# Patient Record
Sex: MALE | Race: WHITE | NOT HISPANIC OR LATINO | Employment: OTHER | URBAN - METROPOLITAN AREA
[De-identification: names, ages, dates, MRNs, and addresses within clinical notes are randomized per-mention and may not be internally consistent; named-entity substitution may affect disease eponyms.]

---

## 2021-05-29 ENCOUNTER — RECORDS - HEALTHEAST (OUTPATIENT)
Dept: ADMINISTRATIVE | Facility: CLINIC | Age: 61
End: 2021-05-29

## 2021-05-30 ENCOUNTER — RECORDS - HEALTHEAST (OUTPATIENT)
Dept: ADMINISTRATIVE | Facility: CLINIC | Age: 61
End: 2021-05-30

## 2021-06-01 ENCOUNTER — RECORDS - HEALTHEAST (OUTPATIENT)
Dept: ADMINISTRATIVE | Facility: CLINIC | Age: 61
End: 2021-06-01

## 2021-06-27 ENCOUNTER — HEALTH MAINTENANCE LETTER (OUTPATIENT)
Age: 61
End: 2021-06-27

## 2021-10-17 ENCOUNTER — HEALTH MAINTENANCE LETTER (OUTPATIENT)
Age: 61
End: 2021-10-17

## 2022-07-23 ENCOUNTER — HEALTH MAINTENANCE LETTER (OUTPATIENT)
Age: 62
End: 2022-07-23

## 2022-10-01 ENCOUNTER — HEALTH MAINTENANCE LETTER (OUTPATIENT)
Age: 62
End: 2022-10-01

## 2023-03-16 ENCOUNTER — TRANSFERRED RECORDS (OUTPATIENT)
Dept: HEALTH INFORMATION MANAGEMENT | Facility: CLINIC | Age: 63
End: 2023-03-16

## 2023-03-26 ENCOUNTER — HOSPITAL ENCOUNTER (EMERGENCY)
Facility: CLINIC | Age: 63
Discharge: HOME | End: 2023-03-26
Attending: EMERGENCY MEDICINE | Admitting: EMERGENCY MEDICINE

## 2023-03-26 ENCOUNTER — APPOINTMENT (OUTPATIENT)
Dept: ULTRASOUND IMAGING | Facility: CLINIC | Age: 63
End: 2023-03-26
Attending: EMERGENCY MEDICINE

## 2023-03-26 VITALS
HEART RATE: 65 BPM | DIASTOLIC BLOOD PRESSURE: 96 MMHG | HEIGHT: 75 IN | WEIGHT: 300 LBS | BODY MASS INDEX: 37.3 KG/M2 | RESPIRATION RATE: 18 BRPM | TEMPERATURE: 97.3 F | SYSTOLIC BLOOD PRESSURE: 179 MMHG | OXYGEN SATURATION: 96 %

## 2023-03-26 DIAGNOSIS — L03.116 CELLULITIS OF LEFT LOWER EXTREMITY: ICD-10-CM

## 2023-03-26 DIAGNOSIS — I82.4Y2 DEEP VEIN THROMBOSIS (DVT) OF PROXIMAL VEIN OF LEFT LOWER EXTREMITY, UNSPECIFIED CHRONICITY (H): ICD-10-CM

## 2023-03-26 PROBLEM — Z79.01 ANTICOAGULATION MONITORING, INR RANGE 2-3: Status: ACTIVE | Noted: 2017-04-26

## 2023-03-26 PROCEDURE — 99284 EMERGENCY DEPT VISIT MOD MDM: CPT | Mod: 25

## 2023-03-26 PROCEDURE — 93971 EXTREMITY STUDY: CPT | Mod: LT

## 2023-03-26 RX ORDER — WARFARIN SODIUM 5 MG/1
5 TABLET ORAL DAILY
Qty: 30 TABLET | Refills: 0 | Status: SHIPPED | OUTPATIENT
Start: 2023-03-26 | End: 2023-05-08

## 2023-03-26 RX ORDER — CEPHALEXIN 500 MG/1
1000 CAPSULE ORAL 3 TIMES DAILY
Qty: 42 CAPSULE | Refills: 0 | Status: SHIPPED | OUTPATIENT
Start: 2023-03-26 | End: 2023-04-02

## 2023-03-26 RX ORDER — ENOXAPARIN SODIUM 100 MG/ML
100 INJECTION SUBCUTANEOUS 2 TIMES DAILY
Qty: 14 ML | Refills: 0 | Status: SHIPPED | OUTPATIENT
Start: 2023-03-26 | End: 2023-04-02

## 2023-03-26 ASSESSMENT — ACTIVITIES OF DAILY LIVING (ADL): ADLS_ACUITY_SCORE: 33

## 2023-03-26 NOTE — DISCHARGE INSTRUCTIONS
For the first week you will be taking all 3 medications.  After 7 days you can stop taking the Lovenox and Keflex.    Antibiotics take about 24 hours to kick in.  That is when the inflammation and swelling and redness stops.  If you notice that your symptoms are getting worse after 24 hours, or if at any time you have nausea, vomiting or unable to keep down antibiotics, I would like you to come back to the emergency department.

## 2023-03-26 NOTE — ED TRIAGE NOTES
Patient presents to ED with some redness, swelling and warmth to area of L calf, patient reports hx of bilateral leg edema that is his baseline, hx of multiple DVTs, patient took himself off of Warfarin about one month ago.  Christy Scott RN.......3/26/2023 10:04 AM     Triage Assessment     Row Name 03/26/23 1001       Triage Assessment (Adult)    Airway WDL WDL       Respiratory WDL    Respiratory WDL WDL       Skin Circulation/Temperature WDL    Skin Circulation/Temperature WDL WDL       Cardiac WDL    Cardiac WDL WDL       Peripheral/Neurovascular WDL    Peripheral Neurovascular WDL WDL       Cognitive/Neuro/Behavioral WDL    Cognitive/Neuro/Behavioral WDL WDL

## 2023-03-26 NOTE — ED PROVIDER NOTES
EMERGENCY DEPARTMENT ENCOUNTER      NAME: Jimmie Oconnor  AGE: 62 year old male  YOB: 1960  MRN: [id number]  EVALUATION DATE & TIME: No admission date for patient encounter.    PCP: Raymon Ramos    ED PROVIDER: Simone Brown M.D.      Chief Complaint   Patient presents with     Leg Swelling         FINAL IMPRESSION:  1. Cellulitis of left lower extremity    2. Deep vein thrombosis (DVT) of proximal vein of left lower extremity, unspecified chronicity (H)          ED COURSE & MEDICAL DECISION MAKING:    Pertinent Labs & Imaging studies reviewed below.  All EKGs below represent my independent interpretation.   ED Course as of 03/26/23 1253   Sun Mar 26, 2023   1018 Patient is a 62-year-old gentleman who presents increased welling in his left calf with some erythema to the anterior aspect of the tibia.  He twisted his ankle 1 week ago, but the redness is new.  History of multiple DVTs, no longer on Coumadin.  Examination the anterior lower leg does have about a 4 to 5 inch diameter area of erythema, either early cellulitis or venous stasis inflammation.  Swelling could be posttraumatic, but plan to get ultrasound to rule out recurrent clot.   1227 US Lower Extremity Venous Duplex Left  1.  Nonocclusive thrombus in the mid and distal femoral vein and popliteal vein. Nonocclusive and occlusive deep veins in the calf. There was thrombus in these areas on the 2009 comparison study as well.  2.  Occluded superficial varices in the calf. Previously superficial calf veins were occluded.  3.  It is difficult to determine which of these changes are new.    I discussed ultrasound findings with the patient.  It is possible to tell whether some of the occlusions are acute or chronic, and I think it would be safest to restart his anticoagulation plan.  He was given a week of Lovenox, 30 days of warfarin.  I also sent him home with a week of Keflex for suspected early cellulitis on the left lower leg.  We  discussed return precautions.  He will follow-up with outpatient primary care, vascular.    10:15 AM I introduced myself to the patient, obtained patient history, performed a physical exam, and discussed plan for ED workup including potential diagnostic laboratory/imaging studies and interventions.     Additional ED Course Timestamps:  12:53 PM Rechecked and updated the patient. We discussed the plan for discharge and the patient is agreeable. Reviewed supportive cares, symptomatic treatment, outpatient follow up, and reasons to return to the Emergency Department. Patient to be discharged by ED RN.     Medical Decision Making    History:    Supplemental history from: Documented in chart, if applicable    External Record(s) reviewed: Documented in chart, if applicable.    Work Up:    Chart documentation includes differential considered and any EKGs or imaging independently interpreted by provider, where specified.    In additional to work up documented, I considered the following work up: Documented in chart, if applicable.    External consultation:    Discussion of management with another provider: Documented in chart, if applicable    Complicating factors:    Care impacted by chronic illness: N/A    Care affected by social determinants of health: N/A    Disposition considerations: Discharge. I prescribed additional prescription strength medication(s) as charted. N/A.        At the conclusion of the encounter I discussed the results of all of the tests and the disposition. The questions were answered. The patient or family acknowledged understanding and was agreeable with the care plan.       MEDICATIONS GIVEN IN THE EMERGENCY:  Medications - No data to display      NEW PRESCRIPTIONS STARTED AT TODAY'S ER VISIT  New Prescriptions    CEPHALEXIN (KEFLEX) 500 MG CAPSULE    Take 2 capsules (1,000 mg) by mouth 3 times daily for 7 days    ENOXAPARIN ANTICOAGULANT (LOVENOX) 100 MG/ML SYRINGE    Inject 1 mL (100 mg)  "Subcutaneous 2 times daily for 7 days    WARFARIN ANTICOAGULANT (COUMADIN) 5 MG TABLET    Take 1 tablet (5 mg) by mouth daily for 30 days          =================================================================    HPI    Patient information was obtained from: patient    Use of : N/A      Jimmie Oconnor is a 62 year old male with a pertinent history of recurrent DVT and obesity who presents to this ED for evaluation of leg edema and erythema.    On 3/19, patient twisted his left ankle while walking on ice. Since then, he has noticed edema and redness in his anterior tibial area. The redness and swelling has reportedly moved upwards. He denies any calf pain or any URI symptoms. He is concerned for cellulitis or a DVT.    He denies any other pain.    Patient is no longer anticoagulated. He uses other natural methods such as Narcisa Therapy to improve his circulation and help prevent blood clots.    Patient reports that his current symptoms are different than when he had a blood clot in his leg.       REVIEW OF SYSTEMS   Review of Systems   All other systems reviewed and are negative.  See HPI documentation.    VITALS:  BP (!) 174/103   Pulse 76   Temp 98.8  F (37.1  C) (Oral)   Resp 18   Ht 1.905 m (6' 3\")   Wt 136.1 kg (300 lb)   SpO2 96%   BMI 37.50 kg/m      PHYSICAL EXAM    Constitutional: Well developed, well nourished. Comfortable appearing.  HENT: Normocephalic, atraumatic, mucous membranes moist, nose normal  Eyes: PERRL, EOMI, conjunctiva normal, no discharge.  Neck- Supple, gross ROM intact.   Respiratory: Normal work of breathing, normal rate, speaks in full sentences  Cardiovascular: Normal heart rate  Musculoskeletal: Moving all 4 extremities intentionally and without pain. Area of erythema to anterior lower left leg. Bilateral lower extremity edema (left greater than right).   Neurologic: Alert & oriented x 3, cranial nerves grossly intact. Normal gross coordination  Psychiatric: " Affect normal, cooperative.        I, Tiffanie Earl am serving as a scribe to document services personally performed by Dr. Simone Brown based on my observation and the provider's statements to me. I, Simone Brown MD attest that Tiffanie Earl is acting in a scribe capacity, has observed my performance of the services and has documented them in accordance with my direction.    Simone Brown M.D.  Emergency Medicine  [facility] EMERGENCY ROOM  [address] [address]  [phone]  Dept: [phone]     Simone Brown MD  03/26/23 8786

## 2023-03-27 ENCOUNTER — TELEPHONE (OUTPATIENT)
Dept: OTHER | Facility: CLINIC | Age: 63
End: 2023-03-27

## 2023-03-27 DIAGNOSIS — Z78.9 WARFARIN AND ENOXAPARIN PRESCRIBED AT DISCHARGE: ICD-10-CM

## 2023-03-27 DIAGNOSIS — Z86.718 HISTORY OF RECURRENT DEEP VEIN THROMBOSIS (DVT): Primary | ICD-10-CM

## 2023-03-27 NOTE — TELEPHONE ENCOUNTER
Future Appointments   Date Time Provider Department Center   3/29/2023  1:00 PM [facility]   4/13/2023  8:00 AM Wilfredo Avery MD [facility]

## 2023-03-27 NOTE — TELEPHONE ENCOUNTER
Appt Note:  Self-referral.  Diagnosed with cellulitis and multiple DVT of LLE 3/26/23, lovenox, warfarin initiated in ED.  LLE Venous US 3/26/23 in Epic; d-dimer prior to visit.    Please arrange for:      D-dimer (non-fasting lab)     In clinic consult with Vascular Medicine 2-3 days later.

## 2023-03-27 NOTE — TELEPHONE ENCOUNTER
[facility]    Who is the name of the provider?: no provider yet  What is the location you see this provider at/preferred location?: [place]  Person calling / Facility: Jen[facility]  Phone number:  [phone]  Nurse call back needed:  n/a     Reason for call:  Pt wife called to get him scheduled with a provider (self referral) for clotting, left leg is red after a recent fall. His recent scan they were unable to tell if they were old clots or new. Pt was in ED yesterday    Pharmacy location:  n/a  Outside Imaging: yes   Can we leave a detailed message on this number?  yes

## 2023-03-29 ENCOUNTER — LAB (OUTPATIENT)
Dept: LAB | Facility: CLINIC | Age: 63
End: 2023-03-29

## 2023-03-29 DIAGNOSIS — Z86.718 HISTORY OF RECURRENT DEEP VEIN THROMBOSIS (DVT): ICD-10-CM

## 2023-03-29 DIAGNOSIS — Z78.9 WARFARIN AND ENOXAPARIN PRESCRIBED AT DISCHARGE: ICD-10-CM

## 2023-03-29 LAB — D DIMER PPP FEU-MCNC: 2.62 UG/ML FEU (ref 0–0.5)

## 2023-03-29 PROCEDURE — 85379 FIBRIN DEGRADATION QUANT: CPT

## 2023-03-29 PROCEDURE — 36415 COLL VENOUS BLD VENIPUNCTURE: CPT

## 2023-04-13 ENCOUNTER — PATIENT OUTREACH (OUTPATIENT)
Dept: ONCOLOGY | Facility: CLINIC | Age: 63
End: 2023-04-13

## 2023-04-13 ENCOUNTER — OFFICE VISIT (OUTPATIENT)
Dept: OTHER | Facility: CLINIC | Age: 63
End: 2023-04-13
Attending: INTERNAL MEDICINE

## 2023-04-13 ENCOUNTER — LAB (OUTPATIENT)
Dept: LAB | Facility: CLINIC | Age: 63
End: 2023-04-13
Attending: INTERNAL MEDICINE

## 2023-04-13 VITALS
OXYGEN SATURATION: 95 % | WEIGHT: 309 LBS | HEART RATE: 73 BPM | DIASTOLIC BLOOD PRESSURE: 86 MMHG | BODY MASS INDEX: 38.62 KG/M2 | SYSTOLIC BLOOD PRESSURE: 124 MMHG

## 2023-04-13 DIAGNOSIS — Z86.711 HISTORY OF PULMONARY EMBOLISM: ICD-10-CM

## 2023-04-13 DIAGNOSIS — I83.12 VARICOSE VEINS OF LEFT LOWER EXTREMITY WITH INFLAMMATION: ICD-10-CM

## 2023-04-13 DIAGNOSIS — R06.83 SNORING: ICD-10-CM

## 2023-04-13 DIAGNOSIS — I82.512 CHRONIC DEEP VEIN THROMBOSIS (DVT) OF FEMORAL VEIN OF LEFT LOWER EXTREMITY (H): ICD-10-CM

## 2023-04-13 DIAGNOSIS — I82.512 CHRONIC DEEP VEIN THROMBOSIS (DVT) OF FEMORAL VEIN OF LEFT LOWER EXTREMITY (H): Primary | ICD-10-CM

## 2023-04-13 DIAGNOSIS — E83.110 HEREDITARY HEMOCHROMATOSIS (H): ICD-10-CM

## 2023-04-13 DIAGNOSIS — I89.0 LYMPHEDEMA: ICD-10-CM

## 2023-04-13 DIAGNOSIS — E66.01 MORBID OBESITY (H): ICD-10-CM

## 2023-04-13 LAB
ALBUMIN SERPL BCG-MCNC: 4.8 G/DL (ref 3.5–5.2)
ALP SERPL-CCNC: 61 U/L (ref 40–129)
ALT SERPL W P-5'-P-CCNC: 31 U/L (ref 10–50)
ANION GAP SERPL CALCULATED.3IONS-SCNC: 9 MMOL/L (ref 7–15)
AST SERPL W P-5'-P-CCNC: 22 U/L (ref 10–50)
BILIRUB SERPL-MCNC: 0.5 MG/DL
BUN SERPL-MCNC: 27.2 MG/DL (ref 8–23)
CALCIUM SERPL-MCNC: 9.9 MG/DL (ref 8.8–10.2)
CHLORIDE SERPL-SCNC: 102 MMOL/L (ref 98–107)
CREAT SERPL-MCNC: 1.22 MG/DL (ref 0.67–1.17)
DEPRECATED HCO3 PLAS-SCNC: 27 MMOL/L (ref 22–29)
FACT X ACT/NOR PPP CHRO: 39 % (ref 70–130)
FERRITIN SERPL-MCNC: 217 NG/ML (ref 31–409)
GFR SERPL CREATININE-BSD FRML MDRD: 67 ML/MIN/1.73M2
GLUCOSE SERPL-MCNC: 106 MG/DL (ref 70–99)
INR PPP: 1.25 (ref 0.85–1.15)
IRON BINDING CAPACITY (ROCHE): 293 UG/DL (ref 240–430)
IRON SATN MFR SERPL: 53 % (ref 15–46)
IRON SERPL-MCNC: 156 UG/DL (ref 61–157)
POTASSIUM SERPL-SCNC: 4.8 MMOL/L (ref 3.4–5.3)
PROT SERPL-MCNC: 7.7 G/DL (ref 6.4–8.3)
SODIUM SERPL-SCNC: 138 MMOL/L (ref 136–145)

## 2023-04-13 PROCEDURE — G0463 HOSPITAL OUTPT CLINIC VISIT: HCPCS | Performed by: INTERNAL MEDICINE

## 2023-04-13 PROCEDURE — 99205 OFFICE O/P NEW HI 60 MIN: CPT | Performed by: INTERNAL MEDICINE

## 2023-04-13 PROCEDURE — G0463 HOSPITAL OUTPT CLINIC VISIT: HCPCS

## 2023-04-13 PROCEDURE — 36415 COLL VENOUS BLD VENIPUNCTURE: CPT

## 2023-04-13 PROCEDURE — 85613 RUSSELL VIPER VENOM DILUTED: CPT

## 2023-04-13 PROCEDURE — 83550 IRON BINDING TEST: CPT

## 2023-04-13 PROCEDURE — 85390 FIBRINOLYSINS SCREEN I&R: CPT | Mod: 26 | Performed by: PATHOLOGY

## 2023-04-13 PROCEDURE — 82728 ASSAY OF FERRITIN: CPT

## 2023-04-13 PROCEDURE — 85610 PROTHROMBIN TIME: CPT

## 2023-04-13 PROCEDURE — 86146 BETA-2 GLYCOPROTEIN ANTIBODY: CPT

## 2023-04-13 PROCEDURE — 86147 CARDIOLIPIN ANTIBODY EA IG: CPT

## 2023-04-13 PROCEDURE — 85260 CLOT FACTOR X STUART-POWER: CPT

## 2023-04-13 PROCEDURE — 80053 COMPREHEN METABOLIC PANEL: CPT

## 2023-04-13 NOTE — PROGRESS NOTES
[facility] INITIAL VASCULAR MEDICINE CONSULT    ( New Patient visit)       PRIMARY HEALTH CARE PROVIDER:  No primary MD (currently )    REFERRING HEALTH CARE PROVIDER;  Self-Referred    REASON FOR CONSULT: Evaluation and management of recurrent thromboembolic event he has a total 2 episodes of left lower extremity DVT and bilateral PE in 2006, 2009 was warfarin chronically discontinued few months ago then developed left lower extremity redness erythema swelling underwent venous duplex which showed questionable new clot versus old clot mildly elevated D-dimer and is back on warfarin again.      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 both parents has a hemochromatosis and he was tested positive with iron overload and he underwent periodic therapeutic blood draws for many years, morbidly obese BMI greater than 38, snoring but no formal diagnosis of sleep apnea developed first lifetime thromboembolic event in 2006 left lower extremity DVT with bilateral PE was on warfarin for a while then followed by again recurrent episode in 2009 since then on warfarin chronically but few months ago he stopped taking the warfarin and recently left lower extremity swelling redness erythema prompted evaluation D-dimer mildly elevated ultrasound questionable old versus mixture of old and no clot.  He is now back on warfarin.  He currently has no primary care physician but has been following up with a functional medicine chiropractic doctor and taking a lot of supplements.  He says he was tested with the factor V Leyden, factor II mutation and other hypercoagulable studies all of them were negative many years ago outside the system unable to locate them.  He also developed left leg swelling with the discoloration and stasis dermatitis on the left leg.  He has a history of varicose veins bilaterally left more than right side and no  previous ablation or interventions.  He has never seen a hematologist for hemochromatosis evaluation but he was tested by his functional chiropractic physician in the past    He underwent extensive laboratory testing on March 16, 2023 [facility] all of them are negative except potassium elevated 5.4 iron was 201% sat was 66 no diabetes INR was normal and he was not taking warfarin at the time hemoglobin was 15.3 MCH was slightly elevated liver function tests are normal.    He accompanied by his wife today for this visit    He is new to me reviewed available records in the epic and updated chart    PAST MEDICAL HISTORY  Past Medical History:   Diagnosis Date     DVT (deep venous thrombosis) (H) reciurrent LLE      Hereditary hemochromatosis (H)      Morbid obesity (H)      Pulmonary embolism (H)      Snoring      Varicose veins of left leg with edema        CURRENT MEDICATIONS  warfarin ANTICOAGULANT (COUMADIN) 5 MG tablet, Take 1 tablet (5 mg) by mouth daily for 30 days    No current facility-administered medications on file prior to visit.      PAST SURGICAL HISTORY:  History reviewed. No pertinent surgical history.    ALLERGIES   No Known Allergies    FAMILY HISTORY  Family History   Problem Relation Age of Onset     Hemochromatosis Mother      Hemochromatosis Father        VASCULAR FAMILY HISTORY  1st order relative with atherosclerotic PAD: No  1st order relative with AAA: Father ?   Family history of Familial Hyperlipidemia No  Family History of Hypercoagulable state:No    VASCULAR RISK FACTORS  1. Diabetes:No   2. Smoking: has never smoked.  3. HTN: uncontrolled  4.Hyperlipidemia: No      SOCIAL HISTORY  Social History     Socioeconomic History     Marital status:      Spouse name: Not on file     Number of children: Not on file     Years of education: Not on file     Highest education level: Not on file   Occupational History     Not on file   Tobacco Use     Smoking status: Never     Smokeless tobacco:  Not on file   Vaping Use     Vaping status: Not on file   Substance and Sexual Activity     Alcohol use: Yes     Drug use: Never     Sexual activity: Not on file   Other Topics Concern     Not on file   Social History Narrative     Not on file     Social Determinants of Health     Financial Resource Strain: Not on file   Food Insecurity: Not on file   Transportation Needs: Not on file   Physical Activity: Not on file   Stress: Not on file   Social Connections: Not on file   Intimate Partner Violence: Not on file   Housing Stability: Not on file       ROS:   General: No change in weight or appetite.  Normal energy.  No fever or chills  History of snoring and his wife thinks occasional apneic episodes  No formal evaluation of sleep apnea  Eyes: Negative for vision changes or eye problems  ENT: No problems with ears, nose or throat.  No difficulty swallowing.  Resp: No coughing, wheezing or shortness of breath  CV: No chest pains or palpitations  GI: No nausea, vomiting,  heartburn, abdominal pain, diarrhea, constipation or change in bowel habits  : No urinary frequency or dysuria, bladder or kidney problems  Musculoskeletal: No significant muscle or joint pains  Neurologic: No headaches, numbness, tingling, weakness, problems with balance or coordination  Psychiatric: No problems with anxiety, depression or mental health  Heme/immune/allergy: No history of bleeding or clotting problems or anemia.  No allergies or immune system problems  Endocrine: No history of thyroid disease, diabetes or other endocrine disorders  Skin: No rashes,worrisome lesions or skin problems  Vascular: History of recurrent DVT of left lower extremity and PE  History of bilateral lower extremity varicose veins left more than right side  Left leg is larger than right side with skin discoloration  Recent history of a cellulitis??  Treated with Keflex for a week  No leg pain or claudication symptoms  EXAM:  /86 (BP Location: Left arm,  Patient Position: Chair, Cuff Size: Adult Large)   Pulse 73   Wt 309 lb (140.2 kg)   SpO2 95%   BMI 38.62 kg/m    In general, the patient is a pleasant male in no apparent distress.    HEENT: NC/AT.  PERRLA.  EOMI.  Sclerae white, not injected.  Nares clear.  Pharynx without erythema or exudate.  Dentition intact.    Neck: No adenopathy.  No thyromegaly. Carotids +2/2 bilaterally without bruits.  No jugular venous distension.   Heart: RRR. Normal S1, S2 splits physiologically. No murmur, rub, click, or gallop. The PMI is in the 5th ICS in the midclavicular line. There is no heave.    Lungs: CTA.  No ronchi, wheezes, rales.  No dullness to percussion.   Abdomen: Soft, nontender, nondistended. No organomegaly. No AAA.  No bruits.   Extremities: Vascular:  He has a symmetrical palpable peripheral pulses  Left lower extremity is much larger than right lower extremity  Bilateral lower extremity varicose veins CEAP 4 CVI left worse than right side  No venous ulceration  Stasis dermatitis of left lower extremity  Phlebo- lymphedema features noted loss of contour of the  left leg dorsum of the foot swelling  No signs of a cellulitis      Labs:    BMP RESULTS:  Lab Results   Component Value Date     09/14/2019    POTASSIUM 4.2 09/14/2019    CHLORIDE 102 09/14/2019    CO2 27 09/14/2019    ANIONGAP 8 09/14/2019    GLC 89 09/14/2019    BUN 16 09/14/2019    CR 1.03 09/14/2019    GFRESTIMATED >60 09/14/2019    GFRESTBLACK >60 09/14/2019    AGUSTIN 9.8 09/14/2019        Procedures:    EXAM: US LOWER EXTREMITY VENOUS DUPLEX LEFT  LOCATION: [facility]  DATE/TIME: 3/26/2023 12:00 PM     INDICATION: leg swelling, eval for blood clot. hx of prior DVT  COMPARISON: None.  TECHNIQUE: Venous Duplex ultrasound of the left lower extremity with and without compression, augmentation and duplex. Color flow and spectral Doppler with waveform analysis performed.     FINDINGS: Exam includes the common femoral,  femoral, popliteal, and contralateral common femoral veins as well as segmentally visualized deep calf veins and greater saphenous vein.      LEFT: The common femoral vein, great saphenous vein, deep femoral vein, and proximal femoral vein are patent without thrombus. There is nonocclusive thrombus in the mid and distal femoral vein and popliteal vein. The posterior tibial vein is patent. The   peroneal vein is occluded. There is nonocclusive thrombus in the gastrocnemius vein. A few varicose veins are occluded. There is mild superficial edema. No popliteal cyst.                                                                      IMPRESSION:  1.  Nonocclusive thrombus in the mid and distal femoral vein and popliteal vein. Nonocclusive and occlusive deep veins in the calf. There was thrombus in these areas on the 2009 comparison study as well.  2.  Occluded superficial varices in the calf. Previously superficial calf veins were occluded.  3.  It is difficult to determine which of these changes are new.      Discussed with Dr. Brown by me over telephone at 12:18 PM.    Assessment and Plan:     1. Chronic deep vein thrombosis (DVT) of femoral vein of left lower extremity (H) recurrent 2006 and 2009   2. History of recurrent pulmonary embolism 2006 and 2009   3. Varicose veins of left lower extremity with inflammation  4. Lymphedema ( phlebo-lymphedema)   5.  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  He went back on warfarin.  Currently has no primary care physician and he was following up with a functional chiropractic doctor and taking some supplements and taking warfarin 5 mg  daily.   Unable to find previous INR test except on March 16, 2023 PT was normal range and he was not taking warfarin at that time.  Reviewed recent venous duplex ultrasound.  He also had a multiple lab tests done [facility] he brought a paper copy which was reviewed and sent to media.  Per patient he underwent factor II factor V and some other hypercoagulable studies many years ago which were negative.  Currently blood bank is not letting him do therapeutic blood draws.  He has no personal history of malignancy, risk factors are morbidly obese with possible DARIUS.  He has a 1 episode of cellulitis of left lower extremity treated with Keflex few weeks ago.    His exam is consistent with Phlebo-lymphedema with venous hypertension and stasis dermatitis  No venous ulceration    I had a lengthy discussion with the patient and his wife regarding recurrent DVT and its impact cons bleeding post thrombotic syndrome with venous hypertension and also chronic venous insufficiency with stasis dermatitis risk for recurrent cellulitis, thrombophlebitis given underlying varicose veins etc..    We discussed about the warfarin versus newer agents DOACs and he is willing to explore I have given information and coupons for Xarelto and Eliquis and we will discuss with his insurance and decide in the future switching    He has been using compression stockings, never seen edema therapist.  He is a long-term/lifetime candidate for anticoagulation this was communicated with the patient  Since he is on warfarin will get APLA testing if they come back negative to simply we can switch to one of the DOAC of his choice    Will also benefit seeing with edema therapist for compression, wraps, MLD and pump therapy etc. he has a old pump at home purchased online suggested him to take it with him for lymphedema therapist appointment  A referral to lymphedema therapist was done    Will get other baseline blood work along with INR and chromogenic factor X  today    If he decides to continue warfarin and INR and chromogenic factor X correlates simply follow INR goal of 2-3 and manage warfarin dose based on the results    If that does not correlate warfarin to be managed based on the chromogenic factor X goal is between 20 and 40 and this is inversely correlates with the INR.    He was advised to establish with new primary care physician preferably internal medicine near [facility]    A referral was made to see hematology oncologist at the [facility] for underlying hemochromatosis and recurrent thromboembolism      - Lupus Anticoagulant Panel; Future  - Cardiolipin Radha IgG and IgM; Future  - Beta 2 Glycoprotein Antibodies IGG IGM; Future  - INR; Future  - Factor 10 chromogenic; Future        - Lymphedema Therapy Referral; Future    5. Hereditary hemochromatosis (H)    Many years ago he was diagnosed hemochromatosis and never seen hematologist he does have a elevated iron but recent ferritin is normal and liver function tests are normal A1c is normal  Previously he underwent therapeutic blood draws  He is [place] descent and both parents has history of hemochromatosis  Unable to find previous work-up for hemochromatosis which was done many years ago outside the system.  Given his recurrent thromboembolic events he will benefit seeing hematology oncology/bleeding and clotting disorder clinic at the [facility] referral was done  I will repeat iron panel, comprehensive metabolic panel today    - IRON; Future  - IRON AND IRON BINDING CAPACITY; Future  - FERRITIN; Future  - Adult Oncology/Hematology  Referral; Future  - Comprehensive metabolic panel; Future    6. Morbid obesity (H) 38.62  7. Snoring     He is morbidly obese with snoring and his wife thinks periodic apneic episodes as well suggested patient to discuss with primary care physician and go for formal sleep study  Lose weight      More than 60 minutes spent on the date of the  encounter doing chart review, history and exam, documentation, and further activities as noted above.  He is new to this clinic reviewed available extensive records in the epic and updated chart    AVS with written instructions given to the patient  He and his wife had a lot of questions and all of them were answered    Virtual visit approximately a month    Wilfredo Avery MD, FAHA, FSVM,FNLA, FACP  Vascular Medicine  Clinical Hypertension specialist  Clinical Lipidologist    .

## 2023-04-13 NOTE — PATIENT INSTRUCTIONS
1.  Since you have a recurrent left lower extremity DVT and recurrent pulmonary embolism you are a long-term/lifetime candidate for anticoagulation.  Since you are already on warfarin we will check the INR and chromogenic factor X today along with APLA testing (lupus anticoagulant, cardiolipin antibodies, beta-2 glycoprotein antibodies) will not pursue other hypercoagulable studies which may come back falsely low in the setting of warfarin.  If the INR and chromogenic factor X correlates with can monitor simply INR and managed the warfarin.  2.  He will benefit seeing hematology/bleeding and clotting disorders clinic at the [facility] given history of hemochromatosis and recurrent DVT and PE for further evaluation and management    3.  Your BMI is greater than 38 with obesity and snoring you will benefit getting sleep study to rule out sleep apnea through the primary care physician's office and please establish with new primary care physician in [facility] [place]    4.  You have a left lower extremity secondary lymphedema ( Phlebo-lymphedema) compression, MLD, wraps will help and I will make a referral to see edema therapist    5.  Will check baseline blood work today including the CMP, INR, chromogenic factor X, iron panel, APLA testing    6.  If the APLA test come back negative you may decide switching warfarin to either Xarelto or Eliquis we can discuss in the future.(Please explore the coupons I have given today)    7.  Take lymphatic formula supplementation which will help both venous insufficiency and lymphatic impairment    8.  Avoid excess vitamin supplements when you are taking warfarin which can cause issues with maintaining therapeutic levels of INR    9.  Virtual video visit in 1 month

## 2023-04-13 NOTE — PROGRESS NOTES
[facility]        Patient is here for a  follow up.     Pt is currently taking Warfarin.    /86 (BP Location: Left arm, Patient Position: Chair, Cuff Size: Adult Large)   Pulse 73   Wt 309 lb (140.2 kg)   SpO2 95%   BMI 38.62 kg/m      The provider has been notified that the patient has no concerns.     Questions patient would like addressed today are: N/A.    Refills are needed: N/A    Has homecare services and agency name:  Sabrina Trevino MA

## 2023-04-13 NOTE — PROGRESS NOTES
Referral received for benign heme services, see below.    Referral reason: Hemochromatosis    Current abnormal labs: Available in Chart Review    Outreach: MyChart sent to patient    Plan: Triage instructions updated and sent to NPS for completion.

## 2023-04-14 ENCOUNTER — TELEPHONE (OUTPATIENT)
Dept: OTHER | Facility: CLINIC | Age: 63
End: 2023-04-14

## 2023-04-14 DIAGNOSIS — I82.512 CHRONIC DEEP VEIN THROMBOSIS (DVT) OF FEMORAL VEIN OF LEFT LOWER EXTREMITY (H): Primary | ICD-10-CM

## 2023-04-14 DIAGNOSIS — I89.0 LYMPHEDEMA: ICD-10-CM

## 2023-04-14 LAB
DRVVT CONFIRM NORMALIZED RATIO: 1.15
DRVVT SCREEN MIX RATIO: 0.98
DRVVT SCREEN RATIO: 1.08
LA PPP-IMP: NEGATIVE
LUPUS INTERPRETATION: ABNORMAL
PTT RATIO: 0.92
THROMBIN TIME: 18.3 SECONDS (ref 13–19)

## 2023-04-14 NOTE — TELEPHONE ENCOUNTER
Patient needs to be scheduled for virtual follow up in 1 month for history of DVT and lymphedema. Labs were completed today- we will call with results.    Appt notes: 1 month follow up to 4/13/23 DVT and lymphedema. Labs completed on 4/14/23.    Lena BLANKENSHIP, RN    [facility]  Office: [phone]  Fax: [phone]

## 2023-04-15 LAB
B2 GLYCOPROT1 IGG SERPL IA-ACNC: 1.1 U/ML
B2 GLYCOPROT1 IGM SERPL IA-ACNC: <2.4 U/ML
CARDIOLIPIN IGG SER IA-ACNC: <2 GPL-U/ML
CARDIOLIPIN IGG SER IA-ACNC: NEGATIVE
CARDIOLIPIN IGM SER IA-ACNC: <2 MPL-U/ML
CARDIOLIPIN IGM SER IA-ACNC: NEGATIVE

## 2023-04-19 ENCOUNTER — TELEPHONE (OUTPATIENT)
Dept: OTHER | Facility: CLINIC | Age: 63
End: 2023-04-19

## 2023-04-19 NOTE — RESULT ENCOUNTER NOTE
All of your recent labs looks good and normal except,    Low INR but chromogenic factor X is good range goal is between 20-40    Slightly elevated serum creatinine 1.22 (drink plenty of water and avoid ibuprofen, Advil and Aleve type of medications)    Iron saturation index was elevated but rest of the iron panel good range    Glucose is slightly elevated at 106    Continue same plan discussed in the clinic and when you are ready we can switch warfarin to either Xarelto or Eliquis.    If you continue warfarin please get INR and chromogenic factor X for next 3 lab draws and manage warfarin dose based on chromogenic factor X goal is between 20 and 40    Establish care with a primary care physician and follow-up with the hematologist as suggested    Follow-up with me virtually as suggested

## 2023-04-19 NOTE — TELEPHONE ENCOUNTER
----- Message from Wilfredo Avery MD sent at 4/19/2023  9:17 AM CDT -----  All of your recent labs looks good and normal except,    Low INR but chromogenic factor X is good range goal is between 20-40    Slightly elevated serum creatinine 1.22 (drink plenty of water and avoid ibuprofen, Advil and Aleve type of medications)    Iron saturation index was elevated but rest of the iron panel good range    Glucose is slightly elevated at 106    Continue same plan discussed in the clinic and when you are ready we can switch warfarin to either Xarelto or Eliquis.    If you continue warfarin please get INR and chromogenic factor X for next 3 lab draws and manage warfarin dose based on chromogenic factor X goal is between 20 and 40    Establish care with a primary care physician and follow-up with the hematologist as suggested    Follow-up with me virtually as suggested

## 2023-04-19 NOTE — TELEPHONE ENCOUNTER
Relayed results via phone message.  Asked patient to call me with any further questions.    Domenica Solano RN BSN  [facility]  [phone]

## 2023-04-26 ENCOUNTER — ONCOLOGY VISIT (OUTPATIENT)
Dept: ONCOLOGY | Facility: CLINIC | Age: 63
End: 2023-04-26
Attending: INTERNAL MEDICINE

## 2023-04-26 ENCOUNTER — LAB (OUTPATIENT)
Dept: INFUSION THERAPY | Facility: CLINIC | Age: 63
End: 2023-04-26
Attending: INTERNAL MEDICINE

## 2023-04-26 ENCOUNTER — PATIENT OUTREACH (OUTPATIENT)
Dept: ONCOLOGY | Facility: CLINIC | Age: 63
End: 2023-04-26

## 2023-04-26 VITALS
SYSTOLIC BLOOD PRESSURE: 144 MMHG | WEIGHT: 310.4 LBS | TEMPERATURE: 98.3 F | DIASTOLIC BLOOD PRESSURE: 80 MMHG | RESPIRATION RATE: 16 BRPM | BODY MASS INDEX: 38.59 KG/M2 | HEIGHT: 75 IN | HEART RATE: 71 BPM

## 2023-04-26 DIAGNOSIS — E83.110 HEREDITARY HEMOCHROMATOSIS (H): Primary | ICD-10-CM

## 2023-04-26 DIAGNOSIS — R79.0 ABNORMAL RESULT OF IRON PROFILE TESTING: ICD-10-CM

## 2023-04-26 DIAGNOSIS — I82.402 RECURRENT ACUTE DEEP VEIN THROMBOSIS (DVT) OF LEFT LOWER EXTREMITY (H): ICD-10-CM

## 2023-04-26 LAB
ALBUMIN SERPL-MCNC: 3.9 G/DL (ref 3.5–5)
ALP SERPL-CCNC: 53 U/L (ref 45–120)
ALT SERPL W P-5'-P-CCNC: 23 U/L (ref 0–45)
ANION GAP SERPL CALCULATED.3IONS-SCNC: 8 MMOL/L (ref 5–18)
AST SERPL W P-5'-P-CCNC: 20 U/L (ref 0–40)
BASOPHILS # BLD AUTO: 0 10E3/UL (ref 0–0.2)
BASOPHILS NFR BLD AUTO: 1 %
BILIRUB SERPL-MCNC: 0.4 MG/DL (ref 0–1)
BUN SERPL-MCNC: 18 MG/DL (ref 8–22)
CALCIUM SERPL-MCNC: 9.2 MG/DL (ref 8.5–10.5)
CHLORIDE BLD-SCNC: 106 MMOL/L (ref 98–107)
CO2 SERPL-SCNC: 25 MMOL/L (ref 22–31)
CREAT SERPL-MCNC: 1.05 MG/DL (ref 0.7–1.3)
EOSINOPHIL # BLD AUTO: 0.1 10E3/UL (ref 0–0.7)
EOSINOPHIL NFR BLD AUTO: 2 %
ERYTHROCYTE [DISTWIDTH] IN BLOOD BY AUTOMATED COUNT: 12.4 % (ref 10–15)
FERRITIN SERPL-MCNC: 190 NG/ML (ref 31–409)
GFR SERPL CREATININE-BSD FRML MDRD: 80 ML/MIN/1.73M2
GLUCOSE BLD-MCNC: 127 MG/DL (ref 70–125)
HCT VFR BLD AUTO: 43.9 % (ref 40–53)
HGB BLD-MCNC: 15 G/DL (ref 13.3–17.7)
IMM GRANULOCYTES # BLD: 0 10E3/UL
IMM GRANULOCYTES NFR BLD: 0 %
LAB DIRECTOR COMMENTS: NORMAL
LAB DIRECTOR DISCLAIMER: NORMAL
LAB DIRECTOR INTERPRETATION: NORMAL
LAB DIRECTOR METHODOLOGY: NORMAL
LAB DIRECTOR RESULTS: NORMAL
LYMPHOCYTES # BLD AUTO: 1.4 10E3/UL (ref 0.8–5.3)
LYMPHOCYTES NFR BLD AUTO: 27 %
MCH RBC QN AUTO: 32.7 PG (ref 26.5–33)
MCHC RBC AUTO-ENTMCNC: 34.2 G/DL (ref 31.5–36.5)
MCV RBC AUTO: 96 FL (ref 78–100)
MONOCYTES # BLD AUTO: 0.4 10E3/UL (ref 0–1.3)
MONOCYTES NFR BLD AUTO: 7 %
NEUTROPHILS # BLD AUTO: 3.3 10E3/UL (ref 1.6–8.3)
NEUTROPHILS NFR BLD AUTO: 63 %
NRBC # BLD AUTO: 0 10E3/UL
NRBC BLD AUTO-RTO: 0 /100
PLATELET # BLD AUTO: 158 10E3/UL (ref 150–450)
POTASSIUM BLD-SCNC: 4.5 MMOL/L (ref 3.5–5)
PROT SERPL-MCNC: 6.8 G/DL (ref 6–8)
RBC # BLD AUTO: 4.59 10E6/UL (ref 4.4–5.9)
RETICS # AUTO: 0.08 10E6/UL (ref 0.01–0.11)
RETICS/RBC NFR AUTO: 1.8 % (ref 0.8–2.7)
SODIUM SERPL-SCNC: 139 MMOL/L (ref 136–145)
SPECIMEN DESCRIPTION: NORMAL
WBC # BLD AUTO: 5.2 10E3/UL (ref 4–11)

## 2023-04-26 PROCEDURE — G0463 HOSPITAL OUTPT CLINIC VISIT: HCPCS

## 2023-04-26 PROCEDURE — 85025 COMPLETE CBC W/AUTO DIFF WBC: CPT | Performed by: INTERNAL MEDICINE

## 2023-04-26 PROCEDURE — 81256 HFE GENE: CPT | Performed by: INTERNAL MEDICINE

## 2023-04-26 PROCEDURE — G0452 MOLECULAR PATHOLOGY INTERPR: HCPCS | Mod: 26 | Performed by: STUDENT IN AN ORGANIZED HEALTH CARE EDUCATION/TRAINING PROGRAM

## 2023-04-26 PROCEDURE — 85045 AUTOMATED RETICULOCYTE COUNT: CPT | Performed by: INTERNAL MEDICINE

## 2023-04-26 PROCEDURE — 36415 COLL VENOUS BLD VENIPUNCTURE: CPT | Performed by: INTERNAL MEDICINE

## 2023-04-26 PROCEDURE — 85060 BLOOD SMEAR INTERPRETATION: CPT | Performed by: PATHOLOGY

## 2023-04-26 PROCEDURE — 82728 ASSAY OF FERRITIN: CPT | Performed by: INTERNAL MEDICINE

## 2023-04-26 PROCEDURE — 99204 OFFICE O/P NEW MOD 45 MIN: CPT | Performed by: INTERNAL MEDICINE

## 2023-04-26 PROCEDURE — 80053 COMPREHEN METABOLIC PANEL: CPT | Performed by: INTERNAL MEDICINE

## 2023-04-26 RX ORDER — HEPARIN SODIUM (PORCINE) LOCK FLUSH IV SOLN 100 UNIT/ML 100 UNIT/ML
5 SOLUTION INTRAVENOUS
Status: CANCELLED | OUTPATIENT
Start: 2023-04-26

## 2023-04-26 RX ORDER — HEPARIN SODIUM,PORCINE 10 UNIT/ML
5 VIAL (ML) INTRAVENOUS
Status: CANCELLED | OUTPATIENT
Start: 2023-04-26

## 2023-04-26 ASSESSMENT — ENCOUNTER SYMPTOMS
ENDOCRINE NEGATIVE: 1
HEMATOLOGIC/LYMPHATIC NEGATIVE: 1
NEUROLOGICAL NEGATIVE: 1
MUSCULOSKELETAL NEGATIVE: 1
PSYCHIATRIC NEGATIVE: 1
CONSTITUTIONAL NEGATIVE: 1

## 2023-04-26 ASSESSMENT — PAIN SCALES - GENERAL: PAINLEVEL: NO PAIN (0)

## 2023-04-26 NOTE — LETTER
"    4/26/2023         RE: Jimmie Oconnor  [address] [address]        Dear Colleague,    Thank you for referring your patient, Jimmie Oconnor, to the [facility]. Please see a copy of my visit note below.    Oncology Rooming Note    April 26, 2023 2:08 PM   Jimmie Oconnor is a 62 year old male who presents for:    Chief Complaint   Patient presents with     Oncology Clinic Visit     Abnormal result of iron profile testing      Initial Vitals: BP (!) 144/80 (Patient Position: Sitting)   Pulse 71   Temp 98.3  F (36.8  C) (Oral)   Resp 16   Ht 1.905 m (6' 3\")   Wt 140.8 kg (310 lb 6.4 oz)   BMI 38.80 kg/m   Estimated body mass index is 38.8 kg/m  as calculated from the following:    Height as of this encounter: 1.905 m (6' 3\").    Weight as of this encounter: 140.8 kg (310 lb 6.4 oz). Body surface area is 2.73 meters squared.  No Pain (0) Comment: Data Unavailable   No LMP for male patient.  Allergies reviewed: Yes  Medications reviewed: Yes    Medications: MEDICATION REFILLS NEEDED TODAY. Provider was notified.  Pharmacy name entered into [employer]: [id number] [address] AT [facility]    Clinical concerns: Warfarin refill.      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   His ferritin was 1500 in 2017, but with regular phlebotomy, he was able to maintain the level <50.  " "His clotting problems were stable on warfarin at 5 mg/day from 2009 - 2023, but stopped the warfarin because policies at [facility] changed and they no longer would allow him to donate blood if on warfarin.  The clot developed after he had stopped the warfarin.  He wants to resume phlebotomy.  He accepts that he'll need to be on a blood thinner and is disinclined to pay the OOP cost for a DOAC.  He currently doesn't have a PCP so would like a referral to establish continuity.    Patient Active Problem List   Diagnosis     Anticoagulation monitoring, INR range 2-3     High serum ferritin     History of recurrent deep vein thrombosis (DVT)     Severe obesity with body mass index (BMI) of 35.0 to 39.9 with serious comorbidity (H)     No current outpatient medications on file.     No current facility-administered medications for this visit.     Past Medical History:   Diagnosis Date     DVT (deep venous thrombosis) (H) reciurrent LLE      Hereditary hemochromatosis (H)      Morbid obesity (H)      Pulmonary embolism (H)      Snoring      Varicose veins of left leg with edema      No past surgical history on file.    Socioeconomic History     Marital status:      Self-employed chiropracter    Review of Systems   Constitutional: Negative.    HENT:  Negative.    Endocrine: Negative.    Genitourinary: Negative.     Musculoskeletal: Negative.         Numerous old injuries from sports, no recent issues.   Skin: Negative.    Neurological: Negative.    Hematological: Negative.    Psychiatric/Behavioral: Negative.    All other systems reviewed and are negative.      BP (!) 144/80 (Patient Position: Sitting)   Pulse 71   Temp 98.3  F (36.8  C) (Oral)   Resp 16   Ht 1.905 m (6' 3\")   Wt 140.8 kg (310 lb 6.4 oz)   BMI 38.80 kg/m      Physical Exam  Vitals and nursing note reviewed. Exam conducted with a chaperone present.   Constitutional:       Appearance: He is obese.      Comments: Tall and obese man in no distress, " accompanied by wife.   HENT:      Head: Normocephalic and atraumatic.      Nose: Nose normal.      Mouth/Throat:      Mouth: Mucous membranes are moist.   Eyes:      Extraocular Movements: Extraocular movements intact.      Conjunctiva/sclera: Conjunctivae normal.      Pupils: Pupils are equal, round, and reactive to light.   Cardiovascular:      Rate and Rhythm: Normal rate and regular rhythm.      Heart sounds: Normal heart sounds.   Pulmonary:      Effort: Pulmonary effort is normal.      Breath sounds: Normal breath sounds.   Abdominal:      Palpations: Abdomen is soft. There is no mass.      Tenderness: There is no abdominal tenderness.   Musculoskeletal:         General: No deformity. Normal range of motion.      Cervical back: Normal range of motion and neck supple.      Left lower leg: Edema (left ankle 31.5 cm, right is 29 cm) present.   Lymphadenopathy:      Cervical: No cervical adenopathy.   Skin:     General: Skin is warm and dry.   Neurological:      General: No focal deficit present.      Mental Status: He is oriented to person, place, and time.      Cranial Nerves: No cranial nerve deficit.      Coordination: Coordination normal.   Psychiatric:         Mood and Affect: Mood normal.         Behavior: Behavior normal. Behavior is cooperative.         Thought Content: Thought content normal.         Judgment: Judgment normal.         Recent Results (from the past 720 hour(s))   D dimer quantitative    Collection Time: 03/29/23 12:47 PM   Result Value Ref Range    D-Dimer Quantitative 2.62 (H) 0.00 - 0.50 ug/mL [facility]   Lupus Anticoagulant Panel    Collection Time: 04/13/23 10:36 AM   Result Value Ref Range    Thrombin Time 18.3 13.0 - 19.0 Seconds    PTT Ratio 0.92 <1.21    DRVVT Screen Ratio 1.08 (H) <1.08    DRVVT Confirm Normalized Ratio 1.15 <1.21    DRVVT Screen Mix Ratio 0.98 <1.08    Lupus Result Negative Negative    Lupus Interpretation       INR is elevated.  APTT ratio is normal.    DRVVT Screen  ratio is elevated.  DRVVT Confirm Normalized ratio is normal.  DRVVT Screen Mix ratio is normal.  Thrombin time is normal.  NEGATIVE TEST; A LUPUS ANTICOAGULANT WAS NOT DETECTED IN THIS SPECIMEN WITHIN THE LIMITS OF THE TESTING REPERTOIRE.  If the clinical picture is strongly suggestive of an antiphospholipid syndrome, recommend anticardiolipin and beta-2-glycoprotein (IgG and IgM) antibody tests.  DRVVT Confirm Normalized ratio and DRVVT Screen Mix ratio  are suggestive of factor deficiency  When the INR is elevated due to warfarin, factors 2, 7, 9, and 10 (factors II, VII, IX, and X) are expected to be decreased and may explain the mixing results. If the patient is not on warfarin, recommend checking factor levels. Clinical correlation is recommended.    Maricruz Rodriguez MD, PhD  [facility]         Cardiolipin Radha IgG and IgM    Collection Time: 04/13/23 10:36 AM   Result Value Ref Range    Cardiolipin Radha IgG Instrument Value <2.0 <10.0 GPL-U/mL    Cardiolipin Antibody IgG Negative Negative    Cardiolipin Radha IgM Instrument Value <2.0 <10.0 MPL-U/mL    Cardiolipin Antibody IgM Negative Negative   Beta 2 Glycoprotein Antibodies IGG IGM    Collection Time: 04/13/23 10:36 AM   Result Value Ref Range    Beta 2 Glycoprotein 1 Antibody IgG 1.1 <7.0 U/mL    Beta 2 Glycoprotein 1 Antibody IgM <2.4 <7.0 U/mL   IRON AND IRON BINDING CAPACITY    Collection Time: 04/13/23 10:36 AM   Result Value Ref Range    Iron 156 61 - 157 ug/dL    Iron Binding Capacity 293 240 - 430 ug/dL    Iron Sat Index 53 (H) 15 - 46 %   FERRITIN    Collection Time: 04/13/23 10:36 AM   Result Value Ref Range    Ferritin 217 31 - 409 ng/mL   Comprehensive metabolic panel    Collection Time: 04/13/23 10:36 AM   Result Value Ref Range    Sodium 138 136 - 145 mmol/L    Potassium 4.8 3.4 - 5.3 mmol/L    Chloride 102 98 - 107 mmol/L    Carbon Dioxide (CO2) 27 22 - 29 mmol/L    Anion Gap 9 7 - 15 mmol/L    Urea Nitrogen 27.2 (H) 8.0 - 23.0 mg/dL     Creatinine 1.22 (H) 0.67 - 1.17 mg/dL    Calcium 9.9 8.8 - 10.2 mg/dL    Glucose 106 (H) 70 - 99 mg/dL    Alkaline Phosphatase 61 40 - 129 U/L    AST 22 10 - 50 U/L    ALT 31 10 - 50 U/L    Protein Total 7.7 6.4 - 8.3 g/dL    Albumin 4.8 3.5 - 5.2 g/dL    Bilirubin Total 0.5 <=1.2 mg/dL    GFR Estimate 67 >60 mL/min/1.73m2   INR    Collection Time: 04/13/23 10:36 AM   Result Value Ref Range    INR 1.25 (H) 0.85 - 1.15   Factor 10 chromogenic    Collection Time: 04/13/23 10:36 AM   Result Value Ref Range    Factor 10 Chromogenic 39 (L) 70 - 130 %   Comprehensive metabolic panel    Collection Time: 04/26/23  1:38 PM   Result Value Ref Range    Sodium 139 136 - 145 mmol/L    Potassium 4.5 3.5 - 5.0 mmol/L    Chloride 106 98 - 107 mmol/L    Carbon Dioxide (CO2) 25 22 - 31 mmol/L    Anion Gap 8 5 - 18 mmol/L    Urea Nitrogen 18 8 - 22 mg/dL    Creatinine 1.05 0.70 - 1.30 mg/dL    Calcium 9.2 8.5 - 10.5 mg/dL    Glucose 127 (H) 70 - 125 mg/dL    Alkaline Phosphatase 53 45 - 120 U/L    AST 20 0 - 40 U/L    ALT 23 0 - 45 U/L    Protein Total 6.8 6.0 - 8.0 g/dL    Albumin 3.9 3.5 - 5.0 g/dL    Bilirubin Total 0.4 0.0 - 1.0 mg/dL    GFR Estimate 80 >60 mL/min/1.73m2   CBC with platelets and differential    Collection Time: 04/26/23  1:38 PM   Result Value Ref Range    WBC Count 5.2 4.0 - 11.0 10e3/uL    RBC Count 4.59 4.40 - 5.90 10e6/uL    Hemoglobin 15.0 13.3 - 17.7 g/dL    Hematocrit 43.9 40.0 - 53.0 %    MCV 96 78 - 100 fL    MCH 32.7 26.5 - 33.0 pg    MCHC 34.2 31.5 - 36.5 g/dL    RDW 12.4 10.0 - 15.0 %    Platelet Count 158 150 - 450 10e3/uL    % Neutrophils 63 %    % Lymphocytes 27 %    % Monocytes 7 %    % Eosinophils 2 %    % Basophils 1 %    % Immature Granulocytes 0 %    NRBCs per 100 WBC 0 <1 /100    Absolute Neutrophils 3.3 1.6 - 8.3 10e3/uL    Absolute Lymphocytes 1.4 0.8 - 5.3 10e3/uL    Absolute Monocytes 0.4 0.0 - 1.3 10e3/uL    Absolute Eosinophils 0.1 0.0 - 0.7 10e3/uL    Absolute Basophils 0.0 0.0 - 0.2  10e3/uL    Absolute Immature Granulocytes 0.0 <=0.4 10e3/uL    Absolute NRBCs 0.0 10e3/uL   Reticulocyte count    Collection Time: 04/26/23  1:38 PM   Result Value Ref Range    % Reticulocyte 1.8 0.8 - 2.7 %    Absolute Reticulocyte 0.081 0.010 - 0.110 10e6/uL     No results found for this or any previous visit (from the past 744 hour(s)).        Again, thank you for allowing me to participate in the care of your patient.        Sincerely,        Paulina Pmientel MD

## 2023-04-26 NOTE — PROGRESS NOTES
"Oncology Rooming Note    April 26, 2023 2:08 PM   Jimmie Oconnor is a 62 year old male who presents for:    Chief Complaint   Patient presents with     Oncology Clinic Visit     Abnormal result of iron profile testing      Initial Vitals: BP (!) 144/80 (Patient Position: Sitting)   Pulse 71   Temp 98.3  F (36.8  C) (Oral)   Resp 16   Ht 1.905 m (6' 3\")   Wt 140.8 kg (310 lb 6.4 oz)   BMI 38.80 kg/m   Estimated body mass index is 38.8 kg/m  as calculated from the following:    Height as of this encounter: 1.905 m (6' 3\").    Weight as of this encounter: 140.8 kg (310 lb 6.4 oz). Body surface area is 2.73 meters squared.  No Pain (0) Comment: Data Unavailable   No LMP for male patient.  Allergies reviewed: Yes  Medications reviewed: Yes    Medications: MEDICATION REFILLS NEEDED TODAY. Provider was notified.  Pharmacy name entered into [employer]: [facility] #[id number] [place] [address] [facility]    Clinical concerns: Warfarin refill.      Kristal Cook LPN            "

## 2023-04-26 NOTE — PROGRESS NOTES
"[facility]: Cancer Care                                  Hemachromatosis                                                        Patient was seen here in Hematology Clinic today.   Follow-up needs:  1) Establish with PCP.  2) Needs to be enrolled in anti-coagulation clinic.  Will need phlebotomies, likely monthly.  Orders can be managed by PCP.    Left message for patient to return call, will ask which system he wishes to establish PCP as records indicate previously with [facility] and [facility]. Recently seen by [facility].     Also, patient told Dr. Pimentel his wife is his \"\" and calls should be directed to her. No consent to communicate on file at this time.  Will speak with wife based on patient's verbal expression, if she calls.  Signature:  Reyna East RN      "

## 2023-04-26 NOTE — PROGRESS NOTES
Assessment & Plan     Multiple episodes of venous thrombosis (left leg)  Hereditary hemochromatosis, initially diagnosed 2017    Therapeutic phelebotomy  Assist patient with identifying PCP  Resume anticoagulation with warfarin  Follow up with CBC, ferritin,  NP visit in 6 months    Patient has verbally indicated that his wife will assist with coordinating visits and instructions, reach out to her as needed    History  This is an initial hematology consult for this chiropractor with two hematologic concerns:  Hereditary hemochromatosis, previously well controlled with monthly therapeutic phlebotomy; recurrent left leg venous thrombosis (2006, 2009, and probably earlier this year).   His ferritin was 1500 in 2017, but with regular phlebotomy, he was able to maintain the level <50.  His clotting problems were stable on warfarin at 5 mg/day from 2009 - 2023, but stopped the warfarin because policies at [facility] changed and they no longer would allow him to donate blood if on warfarin.  The clot developed after he had stopped the warfarin.  He wants to resume phlebotomy.  He accepts that he'll need to be on a blood thinner and is disinclined to pay the OOP cost for a DOAC.  He currently doesn't have a PCP so would like a referral to establish continuity.    Patient Active Problem List   Diagnosis     Anticoagulation monitoring, INR range 2-3     High serum ferritin     History of recurrent deep vein thrombosis (DVT)     Severe obesity with body mass index (BMI) of 35.0 to 39.9 with serious comorbidity (H)     No current outpatient medications on file.     No current facility-administered medications for this visit.     Past Medical History:   Diagnosis Date     DVT (deep venous thrombosis) (H) reciurrent LLE      Hereditary hemochromatosis (H)      Morbid obesity (H)      Pulmonary embolism (H)      Snoring      Varicose veins of left leg with edema      No past surgical history on file.    Socioeconomic History     Marital  "status:      Self-employed chiropracter    Review of Systems   Constitutional: Negative.    HENT:  Negative.    Endocrine: Negative.    Genitourinary: Negative.     Musculoskeletal: Negative.         Numerous old injuries from sports, no recent issues.   Skin: Negative.    Neurological: Negative.    Hematological: Negative.    Psychiatric/Behavioral: Negative.    All other systems reviewed and are negative.      BP (!) 144/80 (Patient Position: Sitting)   Pulse 71   Temp 98.3  F (36.8  C) (Oral)   Resp 16   Ht 1.905 m (6' 3\")   Wt 140.8 kg (310 lb 6.4 oz)   BMI 38.80 kg/m      Physical Exam  Vitals and nursing note reviewed. Exam conducted with a chaperone present.   Constitutional:       Appearance: He is obese.      Comments: Tall and obese man in no distress, accompanied by wife.   HENT:      Head: Normocephalic and atraumatic.      Nose: Nose normal.      Mouth/Throat:      Mouth: Mucous membranes are moist.   Eyes:      Extraocular Movements: Extraocular movements intact.      Conjunctiva/sclera: Conjunctivae normal.      Pupils: Pupils are equal, round, and reactive to light.   Cardiovascular:      Rate and Rhythm: Normal rate and regular rhythm.      Heart sounds: Normal heart sounds.   Pulmonary:      Effort: Pulmonary effort is normal.      Breath sounds: Normal breath sounds.   Abdominal:      Palpations: Abdomen is soft. There is no mass.      Tenderness: There is no abdominal tenderness.   Musculoskeletal:         General: No deformity. Normal range of motion.      Cervical back: Normal range of motion and neck supple.      Left lower leg: Edema (left ankle 31.5 cm, right is 29 cm) present.   Lymphadenopathy:      Cervical: No cervical adenopathy.   Skin:     General: Skin is warm and dry.   Neurological:      General: No focal deficit present.      Mental Status: He is oriented to person, place, and time.      Cranial Nerves: No cranial nerve deficit.      Coordination: Coordination normal. "   Psychiatric:         Mood and Affect: Mood normal.         Behavior: Behavior normal. Behavior is cooperative.         Thought Content: Thought content normal.         Judgment: Judgment normal.         Recent Results (from the past 720 hour(s))   D dimer quantitative    Collection Time: 03/29/23 12:47 PM   Result Value Ref Range    D-Dimer Quantitative 2.62 (H) 0.00 - 0.50 ug/mL FEU   Lupus Anticoagulant Panel    Collection Time: 04/13/23 10:36 AM   Result Value Ref Range    Thrombin Time 18.3 13.0 - 19.0 Seconds    PTT Ratio 0.92 <1.21    DRVVT Screen Ratio 1.08 (H) <1.08    DRVVT Confirm Normalized Ratio 1.15 <1.21    DRVVT Screen Mix Ratio 0.98 <1.08    Lupus Result Negative Negative    Lupus Interpretation       INR is elevated.  APTT ratio is normal.    DRVVT Screen ratio is elevated.  DRVVT Confirm Normalized ratio is normal.  DRVVT Screen Mix ratio is normal.  Thrombin time is normal.  NEGATIVE TEST; A LUPUS ANTICOAGULANT WAS NOT DETECTED IN THIS SPECIMEN WITHIN THE LIMITS OF THE TESTING REPERTOIRE.  If the clinical picture is strongly suggestive of an antiphospholipid syndrome, recommend anticardiolipin and beta-2-glycoprotein (IgG and IgM) antibody tests.  DRVVT Confirm Normalized ratio and DRVVT Screen Mix ratio  are suggestive of factor deficiency  When the INR is elevated due to warfarin, factors 2, 7, 9, and 10 (factors II, VII, IX, and X) are expected to be decreased and may explain the mixing results. If the patient is not on warfarin, recommend checking factor levels. Clinical correlation is recommended.    Maricruz Rodriguez MD, PhD  [facility]         Cardiolipin Radha IgG and IgM    Collection Time: 04/13/23 10:36 AM   Result Value Ref Range    Cardiolipin Radha IgG Instrument Value <2.0 <10.0 GPL-U/mL    Cardiolipin Antibody IgG Negative Negative    Cardiolipin Radha IgM Instrument Value <2.0 <10.0 MPL-U/mL    Cardiolipin Antibody IgM Negative Negative   Beta 2 Glycoprotein Antibodies IGG IGM     Collection Time: 04/13/23 10:36 AM   Result Value Ref Range    Beta 2 Glycoprotein 1 Antibody IgG 1.1 <7.0 U/mL    Beta 2 Glycoprotein 1 Antibody IgM <2.4 <7.0 U/mL   IRON AND IRON BINDING CAPACITY    Collection Time: 04/13/23 10:36 AM   Result Value Ref Range    Iron 156 61 - 157 ug/dL    Iron Binding Capacity 293 240 - 430 ug/dL    Iron Sat Index 53 (H) 15 - 46 %   FERRITIN    Collection Time: 04/13/23 10:36 AM   Result Value Ref Range    Ferritin 217 31 - 409 ng/mL   Comprehensive metabolic panel    Collection Time: 04/13/23 10:36 AM   Result Value Ref Range    Sodium 138 136 - 145 mmol/L    Potassium 4.8 3.4 - 5.3 mmol/L    Chloride 102 98 - 107 mmol/L    Carbon Dioxide (CO2) 27 22 - 29 mmol/L    Anion Gap 9 7 - 15 mmol/L    Urea Nitrogen 27.2 (H) 8.0 - 23.0 mg/dL    Creatinine 1.22 (H) 0.67 - 1.17 mg/dL    Calcium 9.9 8.8 - 10.2 mg/dL    Glucose 106 (H) 70 - 99 mg/dL    Alkaline Phosphatase 61 40 - 129 U/L    AST 22 10 - 50 U/L    ALT 31 10 - 50 U/L    Protein Total 7.7 6.4 - 8.3 g/dL    Albumin 4.8 3.5 - 5.2 g/dL    Bilirubin Total 0.5 <=1.2 mg/dL    GFR Estimate 67 >60 mL/min/1.73m2   INR    Collection Time: 04/13/23 10:36 AM   Result Value Ref Range    INR 1.25 (H) 0.85 - 1.15   Factor 10 chromogenic    Collection Time: 04/13/23 10:36 AM   Result Value Ref Range    Factor 10 Chromogenic 39 (L) 70 - 130 %   Comprehensive metabolic panel    Collection Time: 04/26/23  1:38 PM   Result Value Ref Range    Sodium 139 136 - 145 mmol/L    Potassium 4.5 3.5 - 5.0 mmol/L    Chloride 106 98 - 107 mmol/L    Carbon Dioxide (CO2) 25 22 - 31 mmol/L    Anion Gap 8 5 - 18 mmol/L    Urea Nitrogen 18 8 - 22 mg/dL    Creatinine 1.05 0.70 - 1.30 mg/dL    Calcium 9.2 8.5 - 10.5 mg/dL    Glucose 127 (H) 70 - 125 mg/dL    Alkaline Phosphatase 53 45 - 120 U/L    AST 20 0 - 40 U/L    ALT 23 0 - 45 U/L    Protein Total 6.8 6.0 - 8.0 g/dL    Albumin 3.9 3.5 - 5.0 g/dL    Bilirubin Total 0.4 0.0 - 1.0 mg/dL    GFR Estimate 80 >60  mL/min/1.73m2   CBC with platelets and differential    Collection Time: 04/26/23  1:38 PM   Result Value Ref Range    WBC Count 5.2 4.0 - 11.0 10e3/uL    RBC Count 4.59 4.40 - 5.90 10e6/uL    Hemoglobin 15.0 13.3 - 17.7 g/dL    Hematocrit 43.9 40.0 - 53.0 %    MCV 96 78 - 100 fL    MCH 32.7 26.5 - 33.0 pg    MCHC 34.2 31.5 - 36.5 g/dL    RDW 12.4 10.0 - 15.0 %    Platelet Count 158 150 - 450 10e3/uL    % Neutrophils 63 %    % Lymphocytes 27 %    % Monocytes 7 %    % Eosinophils 2 %    % Basophils 1 %    % Immature Granulocytes 0 %    NRBCs per 100 WBC 0 <1 /100    Absolute Neutrophils 3.3 1.6 - 8.3 10e3/uL    Absolute Lymphocytes 1.4 0.8 - 5.3 10e3/uL    Absolute Monocytes 0.4 0.0 - 1.3 10e3/uL    Absolute Eosinophils 0.1 0.0 - 0.7 10e3/uL    Absolute Basophils 0.0 0.0 - 0.2 10e3/uL    Absolute Immature Granulocytes 0.0 <=0.4 10e3/uL    Absolute NRBCs 0.0 10e3/uL   Reticulocyte count    Collection Time: 04/26/23  1:38 PM   Result Value Ref Range    % Reticulocyte 1.8 0.8 - 2.7 %    Absolute Reticulocyte 0.081 0.010 - 0.110 10e6/uL     No results found for this or any previous visit (from the past 744 hour(s)).

## 2023-04-27 LAB
PATH REPORT.COMMENTS IMP SPEC: NORMAL
PATH REPORT.COMMENTS IMP SPEC: NORMAL
PATH REPORT.FINAL DX SPEC: NORMAL
PATH REPORT.RELEVANT HX SPEC: NORMAL

## 2023-04-28 NOTE — PROGRESS NOTES
Called spouse, she said patient would like to stay in the [facility] system. Clinics in [place] or [place] would be ok, but wants to be sure it is the correct doctor, meaning to mange medications, labs, etc.  I told her I would do some research.  I sent her a My Chart message for:    Tiago Gil MD  ACCEPTING NEW PATIENTS  Specialties  Internal Medicine  Pediatrics  Schedule a Visit  Practice Locations Phone    [facility]  [address], [place] [address] (Map) [phone]   [place] [address], [place] [address] ([facility]) [phone]    Spouse also asks for cost of phlebotomis if done here in our Infusion Center. She was given Cost of care number.   She was given our number to schedule. Therapy plan in place and approved.    She is aware some labs are still in process.     Asked her to have Jimmie sign a consent to communicate when he comes in for phlebotomy, she agrees.  Reyna East RN

## 2023-05-02 RX ORDER — WARFARIN SODIUM 5 MG/1
5 TABLET ORAL DAILY
Qty: 60 TABLET | Status: CANCELLED | OUTPATIENT
Start: 2023-05-02

## 2023-05-02 NOTE — TELEPHONE ENCOUNTER
Received message from spouse, patient has approximately one week of Warfarin 5 mg tabs remaining. Asking if they can get a refill.  She plans to schedule a visit for patient with:  Tiago Gil MD  ACCEPTING NEW PATIENTS  Specialties  Internal Medicine     Schedule a Visit  Practice Locations      Phone                [facility]  [address] [place] [id number] (Map) [phone]    [place] [address], [place] [address] (Map)          [phone]  Reyna East RN

## 2023-05-08 RX ORDER — WARFARIN SODIUM 5 MG/1
5 TABLET ORAL DAILY
Qty: 30 TABLET | Refills: 0 | Status: SHIPPED | OUTPATIENT
Start: 2023-05-08 | End: 2023-06-07

## 2023-05-08 NOTE — TELEPHONE ENCOUNTER
Jimmie Oconnor [facility] Med Onc Support Pool  Phone Number: [phone]     We have a scheduled appointment with a general practitioner but that appointment isn't until June 9th so we need the medicine for 1 month.  Thank you.

## 2023-05-08 NOTE — TELEPHONE ENCOUNTER
"Patient aware of one month refill of Warfarin.   sae and he said his upcoming appointment is at [facility] with Dr. Cobos. I asked about monitoring his INR and he said \"that will be discussed with him at the visit.\"  Educated that it is important to know his results, but he says he knows and will maintain his daily intake of Coumadin 5 mg daily.   Reyna East RN    "

## 2023-08-12 ENCOUNTER — HEALTH MAINTENANCE LETTER (OUTPATIENT)
Age: 63
End: 2023-08-12

## 2024-04-29 RX ORDER — HEPARIN SODIUM,PORCINE 10 UNIT/ML
5 VIAL (ML) INTRAVENOUS
OUTPATIENT
Start: 2024-04-29

## 2024-04-29 RX ORDER — HEPARIN SODIUM (PORCINE) LOCK FLUSH IV SOLN 100 UNIT/ML 100 UNIT/ML
5 SOLUTION INTRAVENOUS
OUTPATIENT
Start: 2024-04-29

## 2024-09-29 ENCOUNTER — HEALTH MAINTENANCE LETTER (OUTPATIENT)
Age: 64
End: 2024-09-29

## 2025-08-31 ENCOUNTER — HEALTH MAINTENANCE LETTER (OUTPATIENT)
Age: 65
End: 2025-08-31